# Patient Record
Sex: MALE | Race: WHITE | NOT HISPANIC OR LATINO
[De-identification: names, ages, dates, MRNs, and addresses within clinical notes are randomized per-mention and may not be internally consistent; named-entity substitution may affect disease eponyms.]

---

## 2021-03-14 PROBLEM — Z00.00 ENCOUNTER FOR PREVENTIVE HEALTH EXAMINATION: Status: ACTIVE | Noted: 2021-03-14

## 2021-03-16 ENCOUNTER — APPOINTMENT (OUTPATIENT)
Dept: COLORECTAL SURGERY | Facility: CLINIC | Age: 61
End: 2021-03-16
Payer: COMMERCIAL

## 2021-03-16 DIAGNOSIS — Z82.49 FAMILY HISTORY OF ISCHEMIC HEART DISEASE AND OTHER DISEASES OF THE CIRCULATORY SYSTEM: ICD-10-CM

## 2021-03-16 DIAGNOSIS — Z78.9 OTHER SPECIFIED HEALTH STATUS: ICD-10-CM

## 2021-03-16 DIAGNOSIS — Z86.79 PERSONAL HISTORY OF OTHER DISEASES OF THE CIRCULATORY SYSTEM: ICD-10-CM

## 2021-03-16 PROCEDURE — 99243 OFF/OP CNSLTJ NEW/EST LOW 30: CPT

## 2021-03-16 PROCEDURE — 99072 ADDL SUPL MATRL&STAF TM PHE: CPT

## 2021-03-25 PROBLEM — Z82.49 FAMILY HISTORY OF CORONARY ARTERY DISEASE: Status: ACTIVE | Noted: 2021-03-25

## 2021-03-25 PROBLEM — Z78.9 NON-SMOKER: Status: ACTIVE | Noted: 2021-03-25

## 2021-03-25 PROBLEM — Z86.79 HISTORY OF HYPERTENSION: Status: RESOLVED | Noted: 2021-03-25 | Resolved: 2021-03-25

## 2021-03-25 NOTE — PHYSICAL EXAM
[Abdomen Masses] : No abdominal masses [Abdomen Tenderness] : ~T ~M Abdominal tenderness [Tender] : nontender [JVD] : no jugular venous distention  [Normal Breath Sounds] : Normal breath sounds [Normal Heart Sounds] : normal heart sounds [Normal Rate and Rhythm] : normal rate and rhythm [No Rash or Lesion] : No rash or lesion [Alert] : alert [Oriented to Person] : oriented to person [Oriented to Place] : oriented to place [Oriented to Time] : oriented to time [Calm] : calm [de-identified] : subhash [de-identified] : looks well, NAD [de-identified] : catalino carranza [de-identified] : moves all 4

## 2021-03-25 NOTE — HISTORY OF PRESENT ILLNESS
[FreeTextEntry1] : consultation from Dr. Bush for diverticulitis with perforation. 60 year old male with recent episode of diverticultis with microperforation. he still has ligering llq abdominal pain. symptoms worsening

## 2021-03-25 NOTE — CONSULT LETTER
[Dear  ___] : Dear  [unfilled], [Consult Letter:] : I had the pleasure of evaluating your patient, [unfilled]. [Please see my note below.] : Please see my note below. [Consult Closing:] : Thank you very much for allowing me to participate in the care of this patient.  If you have any questions, please do not hesitate to contact me. [Sincerely,] : Sincerely, [FreeTextEntry3] : Luis Miguel Aguayo MD

## 2021-03-25 NOTE — ASSESSMENT
[FreeTextEntry1] : 60 year old male with hypertension, and complicated diverticultis with microperforation and abdominal pain. discussed with patient 2 options, of observation with diet modification with high fiber and metamucil. or surgery with laparoscopic possible open sigmoid colon rersection. risk and benefits explained including bleeding, infections, sepsis, leak ,need for stoma, dvt, pe, mi , recurrence and death

## 2021-05-18 ENCOUNTER — APPOINTMENT (OUTPATIENT)
Dept: COLORECTAL SURGERY | Facility: CLINIC | Age: 61
End: 2021-05-18
Payer: COMMERCIAL

## 2021-05-18 DIAGNOSIS — R10.32 LEFT LOWER QUADRANT PAIN: ICD-10-CM

## 2021-05-18 DIAGNOSIS — K57.20 DIVERTICULITIS OF LARGE INTESTINE WITH PERFORATION AND ABSCESS W/OUT BLEEDING: ICD-10-CM

## 2021-05-18 PROCEDURE — 99072 ADDL SUPL MATRL&STAF TM PHE: CPT

## 2021-05-18 PROCEDURE — 99214 OFFICE O/P EST MOD 30 MIN: CPT

## 2021-05-21 PROBLEM — R10.32 ABDOMINAL PAIN, LLQ: Status: ACTIVE | Noted: 2021-03-25

## 2021-05-21 NOTE — REVIEW OF SYSTEMS
[Feeling Tired] : feeling tired [Feeling Poorly] : feeling poorly [As Noted in HPI] : as noted in HPI [Abdominal Pain] : abdominal pain [Constipation] : constipation [Negative] : Heme/Lymph

## 2021-05-25 NOTE — CONSULT LETTER
[Please see my note below.] : Please see my note below. [Consult Closing:] : Thank you very much for allowing me to participate in the care of this patient.  If you have any questions, please do not hesitate to contact me. [Sincerely,] : Sincerely, [FreeTextEntry3] : Luis Miguel Aguayo MD

## 2021-05-25 NOTE — HISTORY OF PRESENT ILLNESS
[FreeTextEntry1] :  60 year old male with recent episode of diverticulitis with microperforation. he still has lingering llq abdominal pain. symptoms worsening. since he last saw me he has had several more attacks requiring antibiotics.

## 2021-05-25 NOTE — PHYSICAL EXAM
[Abdomen Tenderness] : ~T ~M Abdominal tenderness [Normal Breath Sounds] : Normal breath sounds [Normal Rate and Rhythm] : normal rate and rhythm [Normal Heart Sounds] : normal heart sounds [Alert] : alert [No Rash or Lesion] : No rash or lesion [Oriented to Person] : oriented to person [Oriented to Time] : oriented to time [Oriented to Place] : oriented to place [Calm] : calm [Abdomen Masses] : No abdominal masses [Tender] : nontender [JVD] : no jugular venous distention  [de-identified] : subhash [de-identified] : looks well, NAD [de-identified] : moves all 4 [de-identified] : catalino carranza

## 2021-05-25 NOTE — ASSESSMENT
[FreeTextEntry1] : 60 year old male with hypertension, and complicated diverticultis with microperforation and continuing diverticulitis and abdominal pain. discussed recommend surgery with laparoscopic possible open sigmoid colon rersection. risk and benefits explained including bleeding, infections, sepsis, leak ,need for stoma, dvt, pe, mi , recurrence and death

## 2022-01-14 ENCOUNTER — NON-APPOINTMENT (OUTPATIENT)
Age: 62
End: 2022-01-14

## 2022-04-11 PROBLEM — K57.20 DIVERTICULITIS OF COLON WITH PERFORATION: Status: ACTIVE | Noted: 2021-03-25
